# Patient Record
Sex: FEMALE | Race: WHITE | URBAN - METROPOLITAN AREA
[De-identification: names, ages, dates, MRNs, and addresses within clinical notes are randomized per-mention and may not be internally consistent; named-entity substitution may affect disease eponyms.]

---

## 2017-04-04 ENCOUNTER — OFFICE VISIT (OUTPATIENT)
Dept: FAMILY MEDICINE | Facility: CLINIC | Age: 32
End: 2017-04-04

## 2017-04-04 VITALS
TEMPERATURE: 98.5 F | HEIGHT: 66 IN | RESPIRATION RATE: 20 BRPM | HEART RATE: 64 BPM | WEIGHT: 152 LBS | DIASTOLIC BLOOD PRESSURE: 70 MMHG | BODY MASS INDEX: 24.43 KG/M2 | SYSTOLIC BLOOD PRESSURE: 118 MMHG

## 2017-04-04 DIAGNOSIS — Q79.60 EHLERS-DANLOS SYNDROME: ICD-10-CM

## 2017-04-04 DIAGNOSIS — Z01.818 PRE-OP EXAM: Primary | ICD-10-CM

## 2017-04-04 LAB
ERYTHROCYTE [DISTWIDTH] IN BLOOD BY AUTOMATED COUNT: 10.5 %
HCT VFR BLD AUTO: 42 % (ref 35–47)
HEMOGLOBIN: 13.6 G/DL (ref 11.7–15.7)
MCH RBC QN AUTO: 30.4 PG (ref 26–33)
MCHC RBC AUTO-ENTMCNC: 32.4 G/DL (ref 31–36)
MCV RBC AUTO: 93.7 FL (ref 78–100)
PLATELET COUNT - QUEST: 224 10^9/L (ref 150–375)
RBC # BLD AUTO: 4.48 10*12/L (ref 3.8–5.2)
WBC # BLD AUTO: 4.2 10*9/L (ref 4–11)

## 2017-04-04 PROCEDURE — 36415 COLL VENOUS BLD VENIPUNCTURE: CPT | Performed by: PHYSICIAN ASSISTANT

## 2017-04-04 PROCEDURE — 99214 OFFICE O/P EST MOD 30 MIN: CPT | Performed by: PHYSICIAN ASSISTANT

## 2017-04-04 PROCEDURE — 85027 COMPLETE CBC AUTOMATED: CPT | Performed by: PHYSICIAN ASSISTANT

## 2017-04-04 NOTE — NURSING NOTE
Fiorella Simpson is here for a pre-op exam.  Questioned patient about current smoking habits.  Pt. has never smoked.  Body mass index is 25.89 kg/(m^2).  PULSE regular  My Chart: active  CLASSIFICATION OF OVERWEIGHT AND OBESITY BY BMI                        Obesity Class           BMI(kg/m2)  Underweight                                    < 18.5  Normal                                         18.5-24.9  Overweight                                     25.0-29.9  OBESITY                     I                  30.0-34.9                             II                 35.0-39.9  EXTREME OBESITY             III                >40                            Patient's  BMI Body mass index is 24.91 kg/(m^2).  http://hin.nhlbi.nih.gov/menuplanner/menu.cgi    Pre-visit planning  Immunizations - up to date  Colonoscopy -   Mammogram -   Asthma -   PHQ9 -    ASHER-7 -

## 2017-04-04 NOTE — PROGRESS NOTES
[facility]  [address]  [address]  [facility] [address]  [phone]  Dept: [phone]    PRE-OP EVALUATION:  Today's date: 2017    Fiorella Simpson (: 1985) presents for pre-operative evaluation assessment as requested by Dr. Cornell.  She requires evaluation and anesthesia risk assessment prior to undergoing surgery/procedure for treatment of left foot .  Proposed procedure: left navicular tendon repair    Date of Surgery/ Procedure: 17  Time of Surgery/ Procedure: 9am  Hospital/Surgical Facility: [facility]  Fax number for surgical facility: [phone], [phone]  Primary Physician: Ahsan Saldivar  Type of Anesthesia Anticipated: to be determined    Patient has a Health Care Directive or Living Will:  NO    1. NO - Do you have a history of heart attack, stroke, stent, bypass or surgery on an artery in the head, neck, heart or legs?  2. NO - Do you ever have any pain or discomfort in your chest?  3. NO - Do you have a history of  Heart Failure?  4. NO - Are you troubled by shortness of breath when: walking on the level, up a slight hill or at night?  5. NO - Do you currently have a cold, bronchitis or other respiratory infection?  6. NO - Do you have a cough, shortness of breath or wheezing?  7. NO - Do you sometimes get pains in the calves of your legs when you walk?  8. NO - Do you or anyone in your family have previous history of blood clots?  9. NO - Do you or does anyone in your family have a serious bleeding problem such as prolonged bleeding following surgeries or cuts?  10. NO - Have you ever had problems with anemia or been told to take iron pills?  11. NO - Have you had any abnormal blood loss such as black, tarry or bloody stools, or abnormal vaginal bleeding?  12. NO - Have you ever had a blood transfusion?  13. NO - Have you or any of your relatives ever had problems with anesthesia?  14. NO - Do you have sleep apnea,  excessive snoring or daytime drowsiness?  15. NO - Do you have any prosthetic heart valves?  16. NO - Do you have prosthetic joints?  17. NO - Is there any chance that you may be pregnant? Facility will do pregnancy test as well on day of operation.      HPI:                                                      Brief HPI related to upcoming procedure: Patty has been having left ankle pain since January 2016. Has tried PT, rest, and various treatments. Dr. Cornell did MRI and believes her navicular tendon is torn, and there is a small fracture. Plan is to reattach tendon, and remove piece of bone. Has not taking anything for the pain.    See problem list for active medical problems.  Problems all longstanding and stable, except as noted/documented.  See ROS for pertinent symptoms related to these conditions.       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 Saw , being managed at [facility]. Contact Slime Saba DNP       Ankle pain 06/26/2013     Priority: Medium     Allergic rhinitis 08/10/2010     Priority: Medium     Problem list name updated by automated process. Provider to review       Obsessive-compulsive disorder 04/25/2007     Priority: Medium     Problem list name updated by automated process. Provider to review       Dysmenorrhea 12/06/2005     Priority: Medium     Other acne 12/06/2005     Priority: Medium     Health Care Home 03/18/2013     Priority: Low     State Tier Level:  Tier 1  Status:  n/a  Care Coordinator:      See Letters for [facility] Care Plan           Living will, counseling/discussion 07/12/2012     Priority: Low     Discussed advance care planning with patient; however, patient declined at this time. 7/12/2012           No past medical history on file.  Past  "Surgical History:   Procedure Laterality Date     C APPENDECTOMY  1999     DENTAL SURGERY  4/2006    [facility]     FOOT SURGERY Right 2009, 2012    fracture repair     GALLBLADDER SURGERY  3/16/11    Dr Okeefe     HERNIORRHAPHY INGUINAL  5/23/2012    Procedure:HERNIORRHAPHY INGUINAL; Left Inguinal Hernia Repair with Mesh       Current Outpatient Prescriptions   Medication Sig Dispense Refill     drospirenone-ethinyl estradiol (JOSEPH) 3-0.03 MG per tablet Take 1 tablet by mouth daily 3 Package 3     OTC products: None, except as noted above    Allergies   Allergen Reactions     Morphine Hives      Latex Allergy: NO    Social History   Substance Use Topics     Smoking status: Never Smoker     Smokeless tobacco: Never Used     Alcohol use 0.0 oz/week     0 drink(s) per week      Comment: rare     History   Drug Use No       REVIEW OF SYSTEMS:                                                    Constitutional, neuro, ENT, endocrine, pulmonary, cardiac, gastrointestinal, genitourinary, musculoskeletal, integument and psychiatric systems are negative, except as otherwise noted.    EXAM:                                                    /70 (BP Location: Right arm, Patient Position: Chair, Cuff Size: Adult Regular)  Pulse 64  Temp 98.5  F (36.9  C) (Oral)  Resp 20  Ht 1.664 m (5' 5.5\")  Wt 68.9 kg (152 lb)  LMP 03/29/2017  BMI 24.91 kg/m2    GENERAL APPEARANCE: healthy, alert and no distress     EYES: EOMI, PERRL     HENT: ear canals and TM's normal and nose and mouth without ulcers or lesions     NECK: no adenopathy, no asymmetry, masses, or scars and thyroid normal to palpation     RESP: lungs clear to auscultation - no rales, rhonchi or wheezes     CV: regular rates and rhythm, normal S1 S2, no S3 or S4 and no murmur, click or rub     ABDOMEN:  soft, nontender, no HSM or masses and bowel sounds normal     MS: extremities normal- no gross deformities noted, no evidence of inflammation in joints, FROM in all " extremities.     SKIN: no suspicious lesions or rashes     NEURO: Normal strength and tone, sensory exam grossly normal, mentation intact and speech normal     PSYCH: mentation appears normal. and affect normal/bright     LYMPHATICS: No axillary, cervical, or supraclavicular nodes    DIAGNOSTICS:                                                    Hemoglobin (indicated for history of anemia or procedure with significant blood loss such as tonsillectomy, major intraperitoneal surgery, vascular surgery, major spine surgery, total joint replacement)  EKG not indicated with age <65, no risk factors.     Office Visit on 04/04/2017   Component Date Value Ref Range Status     WBC 04/04/2017 4.2  4.0 - 11 10*9/L Final     RBC Count 04/04/2017 4.48  3.8 - 5.2 10*12/L Final     Hemoglobin 04/04/2017 13.6  11.7 - 15.7 g/dL Final     Hematocrit 04/04/2017 42.0  35.0 - 47.0 % Final     MCV 04/04/2017 93.7  78 - 100 fL Final     MCH 04/04/2017 30.4  26 - 33 pg Final     MCHC 04/04/2017 32.4  31 - 36 g/dL Final     RDW 04/04/2017 10.5  % Final     Platelet Count 04/04/2017 224  150 - 375 10^9/L Final           IMPRESSION:                                                    Reason for surgery/procedure: Navicular tendon tear in left ankle.  Diagnosis/reason for consult: Pre-operative examination.    The proposed surgical procedure is considered INTERMEDIATE risk.    REVISED CARDIAC RISK INDEX  The patient has the following serious cardiovascular risks for perioperative complications such as (MI, PE, VFib and 3  AV Block):  No serious cardiac risks  INTERPRETATION: 0 risks: Class I (very low risk - 0.4% complication rate)    The patient has the following additional risks for perioperative complications:  No identified additional risks      ICD-10-CM    1. Pre-op exam Z01.818 HEMOGRAM/PLATELET (BFP)     VENOUS COLLECTION   2. Chance-Danlos syndrome Q79.6        RECOMMENDATIONS:                                                         --Patient is to take all scheduled medications on the day of surgery EXCEPT for modifications listed below.    APPROVAL GIVEN to proceed with proposed procedure, without further diagnostic evaluation    1. Seven days before surgery do not take Aspirin or any over-the-counter pain medications other than Tylenol.  TYLENOL is the safest pain pill to use before surgery because it does not affect your bleeding time. If tylenol is not sufficient for pain control talk to me or the surgeon and we will decide what is safe to use.    2. Do not eat anything after midnight  (betty of the surgery) and nothing the morning of the surgery.    3. Medications: Take OCP as usual on day before surgery, then wait until after surgery to take that day's pill.     4. Follow all instructions given by the surgery team. They usually give out a packet. Read it and please follow it precisely. This helps surgical experience and outcomes.    5. If you have any questions do not hesitate to call me or the surgeon/surgical team.      Marli Chamberlain PA-C  [facility]         Signed Electronically by: Marli Chamberlain PA-C    Copy of this evaluation report is provided to requesting physician.    [facility] Preop Guidelines

## 2017-04-04 NOTE — MR AVS SNAPSHOT
"              After Visit Summary   4/4/2017    Fiorella Simpson    MRN: [id number]           Patient Information     Date Of Birth          1985        Visit Information        Provider Department      4/4/2017 11:00 AM Marli Chamberlain PA-C [facility]        Today's Diagnoses     Pre-op exam    -  1    Chance-Danlos syndrome           Follow-ups after your visit        Follow-up notes from your care team     Return if symptoms worsen or fail to improve.      Who to contact     If you have questions or need follow up information about today's clinic visit or your schedule please contact [facility] directly at [phone].  Normal or non-critical lab and imaging results will be communicated to you by [employer], letter or phone within 4 business days after the clinic has received the results. If you do not hear from us within 7 days, please contact the clinic through [employer] or phone. If you have a critical or abnormal lab result, we will notify you by phone as soon as possible.  Submit refill requests through [employer] or call your pharmacy and they will forward the refill request to us. Please allow 3 business days for your refill to be completed.          Additional Information About Your Visit        MyChart Information     [employer] gives you secure access to your electronic health record. If you see a primary care provider, you can also send messages to your care team and make appointments. If you have questions, please call your primary care clinic.  If you do not have a primary care provider, please call [phone] and they will assist you.        Care EveryWhere ID     This is your Care EveryWhere ID. This could be used by other organizations to access your [place] medical records  [id number]        Your Vitals Were     Pulse Temperature Respirations Height Last Period BMI (Body Mass Index)    64 98.5  F (36.9  C) (Oral) 20 1.664 m (5' 5.5\") " 03/29/2017 24.91 kg/m2       Blood Pressure from Last 3 Encounters:   04/04/17 118/70   03/12/15 130/85   04/14/14 114/68    Weight from Last 3 Encounters:   04/04/17 68.9 kg (152 lb)   03/12/15 71.4 kg (157 lb 6 oz)   04/14/14 74.4 kg (164 lb)              We Performed the Following     HEMOGRAM/PLATELET (BFP)     VENOUS COLLECTION        Primary Care Provider Office Phone # Fax #    Ahsan Saldivar [phone]       [facility] [address] [facility] [address]  [facility] [address]        Thank you!     Thank you for choosing [facility]  for your care. Our goal is always to provide you with excellent care. Hearing back from our patients is one way we can continue to improve our services. Please take a few minutes to complete the written survey that you may receive in the mail after your visit with us. Thank you!             Your Updated Medication List - Protect others around you: Learn how to safely use, store and throw away your medicines at www.disposemymeds.org.          This list is accurate as of: 4/4/17 11:59 PM.  Always use your most recent med list.                   Brand Name Dispense Instructions for use    drospirenone-ethinyl estradiol 3-0.03 MG per tablet    JOSEPH    3 Package    Take 1 tablet by mouth daily

## 2017-04-05 ENCOUNTER — TELEPHONE (OUTPATIENT)
Dept: FAMILY MEDICINE | Facility: CLINIC | Age: 32
End: 2017-04-05

## 2017-04-05 NOTE — TELEPHONE ENCOUNTER
[facility] is requesting pre-op notes for surgery tomorrow.  It is currently unsigned please have Marli sign and fax to [phone].

## 2017-06-03 ENCOUNTER — HEALTH MAINTENANCE LETTER (OUTPATIENT)
Age: 32
End: 2017-06-03

## 2017-06-09 ENCOUNTER — THERAPY VISIT (OUTPATIENT)
Dept: PHYSICAL THERAPY | Facility: CLINIC | Age: 32
End: 2017-06-09
Payer: COMMERCIAL

## 2017-06-09 DIAGNOSIS — Z47.89 AFTERCARE FOLLOWING SURGERY OF THE MUSCULOSKELETAL SYSTEM: ICD-10-CM

## 2017-06-09 DIAGNOSIS — S99.922A FOOT INJURY, LEFT, INITIAL ENCOUNTER: Primary | ICD-10-CM

## 2017-06-09 PROCEDURE — 97110 THERAPEUTIC EXERCISES: CPT | Mod: GP | Performed by: PHYSICAL THERAPIST

## 2017-06-09 PROCEDURE — 97161 PT EVAL LOW COMPLEX 20 MIN: CPT | Mod: GP | Performed by: PHYSICAL THERAPIST

## 2017-06-09 NOTE — LETTER
KATHIE B[facility]MUDE[facility] [place] [facility]  [address] [address]  [address]  [facility] [address]  [phone]    2017  Re: Fiorella Simpson   :   1985  MRN:  [id number]   REFERRING PHYSICIAN:   Leonardo BERMUDEZ [place] [facility]  Date of Initial Evaluation:  2017  Visits:  Rxs Used: 1  Reason for Referral:     Foot injury, left, initial encounter  Aftercare following surgery of the musculoskeletal system    EVALUATION SUMMARY  Fiorella Simpson is a 31 year old female patient presenting to Physical Therapy with the following Primary Symptoms: Left sided ankle pain and stiffness, some tenderness along the inside of the left foot.  She denies having related symptoms spreading to the toes.  She does have some heel bruising. These pains are described as constant.   She denies having painful cough/sneeze, saddle anaesthesia, severe night pain, peripheral motor deficit, recent bowel/bladder change, recent vision change, ringing in the ears or pain with swallowing. Pain is rated 0/10 at rest, and 2/10 at worst. History of symptoms: Long Hx of foot pains,  Two surg on right side 2008, and ankle stab procedure .  L foot pain started 2016 stepped and felt immediate pain.  Now underwent Kidner procedure.  2017.  Since onset, these pains are improving :  Current Symptoms are worsened by: stair climbing sideways, walking out of brace, avoiding incline decline ramps, . Current Symptoms are relieved by ankle brace.   Recent surgical procedure: L Kidner procedure Performed on 2016.   General health as reported by patient is:  excellent.  Pertinent medical history: hernia repair abdominal with mesh, gallbladder, appendix, nasal septoplasty.  She denies any significant current illness or recent hospital admissions. She denies any regonal implanted devices.  Current occupational status: work from home, computer. Previous functional status:  fully functional prior to pain onset/injury.        Objective:  Gait:  Bilateral femoral IR, bilat toe in, short stride    Ankle/Foot Evaluation  ROM:  AROM is normal.PROM is normal.  Strength:    Dorsiflexion:  Left: 5/5     Pain:     Plantarflexion: Left: 4-/5   Pain:     Inversion:Left: 4/5  Pain:       Eversion:Left: 4/5  Pain:    LIGAMENT TESTING: not assessed  SPECIAL TESTS: not assessed  PALPATION:   Left ankle tenderness present at:  incisional  EDEMA: normal    Re: Fiorella Simpson   :   1985    MOBILITY TESTING:   Talocrural Left: normal      Subtalar Left: normal      Midtarsal Left: hypermobile      First Ray Left: hypermobile      FUNCTIONAL TESTS:   Quad:  Single Leg Squat Left: Control is mild loss of control.  Proprioception:  Stork Balance Test: Left: 3 seconds eyes closed  Right: 10 sec eyes closed     Assessment/Plan:    Patient is a 31 year old female with left side ankle complaints.    Patient has the following significant findings with corresponding treatment plan.                Diagnosis 1:  S/P navicular debriedment Kidner procedure   Pain -  hot/cold therapy, manual therapy, splint/taping/bracing/orthotics, self management, education, directional preference exercise and home program  Decreased ROM/flexibility - manual therapy and therapeutic exercise  Decreased strength - therapeutic exercise and therapeutic activities  Decreased proprioception - neuro re-education and therapeutic activities  Inflammation - cold therapy and self management/home program  Impaired gait - gait training  Impaired muscle performance - neuro re-education  Decreased function - therapeutic activities  Impaired posture - neuro re-education  Instability -  Therapeutic Activity  Therapeutic Exercise    Therapy Evaluation Codes:   1) History comprised of:   Personal factors that impact the plan of care:      None.    Comorbidity factors that impact the plan of care are:      None.     Medications impacting care: None.  2) Examination of Body Systems  comprised of:   Body structures and functions that impact the plan of care:      foot.   Activity limitations that impact the plan of care are:      Jumping, Running, Squatting/kneeling, Stairs, Standing and Walking.  3) Clinical presentation characteristics are:   Stable/Uncomplicated.  4) Decision-Making    Low complexity using standardized patient assessment instrument and/or measureable assessment of functional outcome.                Re: Fiorella Simpson   :   1985    Cumulative Therapy Evaluation is: Low complexity.  Previous and current functional limitations:  (See Goal Flow Sheet for this information)    Short term and Long term goals: (See Goal Flow Sheet for this information)   Communication ability:  Patient appears to be able to clearly communicate and understand verbal and written communication and follow directions correctly.  Treatment Explanation - The following has been discussed with the patient:   RX ordered/plan of care  Anticipated outcomes  Possible risks and side effects  This patient would benefit from PT intervention to resume normal activities.   Rehab potential is excellent.  Frequency:  1 X week, once daily  Duration:  for 8 weeks  Discharge Plan:  Achieve all LTG.  Independent in home treatment program.  Reach maximal therapeutic benefit.    Thank you for your referral.    INQUIRIES  Therapist: Paul Breyen, MA, PT, OCS, Cert. PRISCILLA BERMU[facility] [place] [facility]  [address] [address] [address]  Phone: [phone]  Fax: [phone]

## 2017-06-09 NOTE — PROGRESS NOTES
Fiorella Simpson is a 31 year old female patient presenting to Physical Therapy with the following Primary Symptoms: Left sided ankle pain and stiffness, some tenderness along the inside of the left foot.  She denies having related symptoms spreading to the toes.  She does have some heel bruising. These pains are described as constant.   She denies having painful cough/sneeze, saddle anaesthesia, severe night pain, peripheral motor deficit, recent bowel/bladder change, recent vision change, ringing in the ears or pain with swallowing. Pain is rated 0/10 at rest, and 2/10 at worst. History of symptoms: Long Hx of foot pains,  Two surg on right side Jan 2008, and ankle stab procedure 2013.  L foot pain started Jan 2016 stepped and felt immediate pain.  Now underwent Kidner procedure.  April 7th 2017.  Since onset, these pains are improving :  Current Symptoms are worsened by: stair climbing sideways, walking out of brace, avoiding incline decline ramps, . Current Symptoms are relieved by ankle brace.   Recent surgical procedure: L Kidner procedure Performed on April 7th 2016.   General health as reported by patient is:  excellent.  Pertinent medical history: hernia repair abdominal with mesh, gallbladder, appendix, nasal septoplasty.  She denies any significant current illness or recent hospital admissions. She denies any regonal implanted devices.  Current occupational status: work from home, computer. Previous functional status:  fully functional prior to pain onset/injury.         HPI                    Objective:      Gait:  Bilateral femoral IR, bilat toe in, short stride                Ankle/Foot Evaluation  ROM:  AROM is normal.PROM is normal.      Strength:    Dorsiflexion:  Left: 5/5     Pain:     Plantarflexion: Left: 4-/5   Pain:     Inversion:Left: 4/5  Pain:       Eversion:Left: 4/5  Pain:                    LIGAMENT TESTING: not assessed              SPECIAL TESTS: not assessed    PALPATION:   Left ankle  tenderness present at:  incisional    EDEMA: normal          MOBILITY TESTING:       Talocrural Left: normal      Subtalar Left: normal      Midtarsal Left: hypermobile      First Ray Left: hypermobile      FUNCTIONAL TESTS:         Quad:  Single Leg Squat Left: Control is mild loss of control.        Proprioception:  Stork Balance Test: Left: 3 seconds eyes closed  Right: 10 sec eyes closed                                                     General     ROS    Assessment/Plan:      Patient is a 31 year old female with left side ankle complaints.    Patient has the following significant findings with corresponding treatment plan.                Diagnosis 1:  S/P navicular debriedment Kidner procedure   Pain -  hot/cold therapy, manual therapy, splint/taping/bracing/orthotics, self management, education, directional preference exercise and home program  Decreased ROM/flexibility - manual therapy and therapeutic exercise  Decreased strength - therapeutic exercise and therapeutic activities  Decreased proprioception - neuro re-education and therapeutic activities  Inflammation - cold therapy and self management/home program  Impaired gait - gait training  Impaired muscle performance - neuro re-education  Decreased function - therapeutic activities  Impaired posture - neuro re-education  Instability -  Therapeutic Activity  Therapeutic Exercise    Therapy Evaluation Codes:   1) History comprised of:   Personal factors that impact the plan of care:      None.    Comorbidity factors that impact the plan of care are:      None.     Medications impacting care: None.  2) Examination of Body Systems comprised of:   Body structures and functions that impact the plan of care:      foot.   Activity limitations that impact the plan of care are:      Jumping, Running, Squatting/kneeling, Stairs, Standing and Walking.  3) Clinical presentation characteristics are:   Stable/Uncomplicated.  4) Decision-Making    Low complexity using  standardized patient assessment instrument and/or measureable assessment of functional outcome.  Cumulative Therapy Evaluation is: Low complexity.    Previous and current functional limitations:  (See Goal Flow Sheet for this information)    Short term and Long term goals: (See Goal Flow Sheet for this information)     Communication ability:  Patient appears to be able to clearly communicate and understand verbal and written communication and follow directions correctly.  Treatment Explanation - The following has been discussed with the patient:   RX ordered/plan of care  Anticipated outcomes  Possible risks and side effects  This patient would benefit from PT intervention to resume normal activities.   Rehab potential is excellent.    Frequency:  1 X week, once daily  Duration:  for 8 weeks  Discharge Plan:  Achieve all LTG.  Independent in home treatment program.  Reach maximal therapeutic benefit.    Please refer to the daily flowsheet for treatment today, total treatment time and time spent performing 1:1 timed codes.

## 2017-06-16 ENCOUNTER — THERAPY VISIT (OUTPATIENT)
Dept: PHYSICAL THERAPY | Facility: CLINIC | Age: 32
End: 2017-06-16
Payer: COMMERCIAL

## 2017-06-16 DIAGNOSIS — Z47.89 AFTERCARE FOLLOWING SURGERY OF THE MUSCULOSKELETAL SYSTEM: ICD-10-CM

## 2017-06-16 DIAGNOSIS — S99.922A FOOT INJURY, LEFT, INITIAL ENCOUNTER: ICD-10-CM

## 2017-06-16 PROCEDURE — 97110 THERAPEUTIC EXERCISES: CPT | Mod: GP | Performed by: PHYSICAL THERAPIST

## 2017-06-16 PROCEDURE — 97140 MANUAL THERAPY 1/> REGIONS: CPT | Mod: GP | Performed by: PHYSICAL THERAPIST

## 2017-06-23 ENCOUNTER — THERAPY VISIT (OUTPATIENT)
Dept: PHYSICAL THERAPY | Facility: CLINIC | Age: 32
End: 2017-06-23
Payer: COMMERCIAL

## 2017-06-23 DIAGNOSIS — S99.922A FOOT INJURY, LEFT, INITIAL ENCOUNTER: ICD-10-CM

## 2017-06-23 DIAGNOSIS — Z47.89 AFTERCARE FOLLOWING SURGERY OF THE MUSCULOSKELETAL SYSTEM: ICD-10-CM

## 2017-06-23 PROCEDURE — 97112 NEUROMUSCULAR REEDUCATION: CPT | Mod: GP | Performed by: PHYSICAL THERAPIST

## 2017-06-23 PROCEDURE — 97110 THERAPEUTIC EXERCISES: CPT | Mod: GP | Performed by: PHYSICAL THERAPIST

## 2017-06-23 PROCEDURE — 97530 THERAPEUTIC ACTIVITIES: CPT | Mod: GP | Performed by: PHYSICAL THERAPIST

## 2017-06-30 ENCOUNTER — THERAPY VISIT (OUTPATIENT)
Dept: PHYSICAL THERAPY | Facility: CLINIC | Age: 32
End: 2017-06-30
Payer: COMMERCIAL

## 2017-06-30 DIAGNOSIS — S99.922A FOOT INJURY, LEFT, INITIAL ENCOUNTER: ICD-10-CM

## 2017-06-30 DIAGNOSIS — Z47.89 AFTERCARE FOLLOWING SURGERY OF THE MUSCULOSKELETAL SYSTEM: ICD-10-CM

## 2017-06-30 PROCEDURE — 97112 NEUROMUSCULAR REEDUCATION: CPT | Mod: GP | Performed by: PHYSICAL THERAPIST

## 2017-06-30 PROCEDURE — 97530 THERAPEUTIC ACTIVITIES: CPT | Mod: GP | Performed by: PHYSICAL THERAPIST

## 2017-07-07 ENCOUNTER — THERAPY VISIT (OUTPATIENT)
Dept: PHYSICAL THERAPY | Facility: CLINIC | Age: 32
End: 2017-07-07
Payer: COMMERCIAL

## 2017-07-07 DIAGNOSIS — S99.922A FOOT INJURY, LEFT, INITIAL ENCOUNTER: ICD-10-CM

## 2017-07-07 DIAGNOSIS — Z47.89 AFTERCARE FOLLOWING SURGERY OF THE MUSCULOSKELETAL SYSTEM: ICD-10-CM

## 2017-07-07 PROCEDURE — 97530 THERAPEUTIC ACTIVITIES: CPT | Mod: GP | Performed by: PHYSICAL THERAPIST

## 2017-07-07 PROCEDURE — 97112 NEUROMUSCULAR REEDUCATION: CPT | Mod: GP | Performed by: PHYSICAL THERAPIST

## 2017-07-21 ENCOUNTER — THERAPY VISIT (OUTPATIENT)
Dept: PHYSICAL THERAPY | Facility: CLINIC | Age: 32
End: 2017-07-21
Payer: COMMERCIAL

## 2017-07-21 DIAGNOSIS — S99.922A FOOT INJURY, LEFT, INITIAL ENCOUNTER: ICD-10-CM

## 2017-07-21 DIAGNOSIS — Z47.89 AFTERCARE FOLLOWING SURGERY OF THE MUSCULOSKELETAL SYSTEM: ICD-10-CM

## 2017-07-21 PROCEDURE — 97140 MANUAL THERAPY 1/> REGIONS: CPT | Mod: GP | Performed by: PHYSICAL THERAPIST

## 2017-07-21 PROCEDURE — 97530 THERAPEUTIC ACTIVITIES: CPT | Mod: GP | Performed by: PHYSICAL THERAPIST

## 2017-07-21 PROCEDURE — 97112 NEUROMUSCULAR REEDUCATION: CPT | Mod: GP | Performed by: PHYSICAL THERAPIST

## 2017-08-11 ENCOUNTER — THERAPY VISIT (OUTPATIENT)
Dept: PHYSICAL THERAPY | Facility: CLINIC | Age: 32
End: 2017-08-11
Payer: COMMERCIAL

## 2017-08-11 DIAGNOSIS — S99.922A FOOT INJURY, LEFT, INITIAL ENCOUNTER: ICD-10-CM

## 2017-08-11 DIAGNOSIS — Z47.89 AFTERCARE FOLLOWING SURGERY OF THE MUSCULOSKELETAL SYSTEM: ICD-10-CM

## 2017-08-11 PROCEDURE — 97112 NEUROMUSCULAR REEDUCATION: CPT | Mod: GP | Performed by: PHYSICAL THERAPIST

## 2017-08-11 PROCEDURE — 97530 THERAPEUTIC ACTIVITIES: CPT | Mod: GP | Performed by: PHYSICAL THERAPIST

## 2017-08-11 NOTE — PROGRESS NOTES
Subjective:    HPI                    Objective:    System    Physical Exam    General     ROS    Assessment/Plan:      DISCHARGE REPORT    Progress reporting period is from 6/9/2017 to 8/11/2017.       SUBJECTIVE  Subjective changes noted by patient: Patient did a 5k in under 50 minutes while walking with the brace. Did 45 minutes on a paved trail without the brace. 15 minutes on an unpaved trail without the brace. In general has not been wearing the brace much and has been having no issues. Had one instance of pain where a little girl pulled on her arm and she had to take a quick step to maintain balance; she was wearing the brace when this happened.     Current Pain level: 0/10.     Changes in function:  Yes (See Goal flowsheet attached for changes in current functional level)  Adverse reaction to treatment or activity: None    OBJECTIVE  Changes noted in objective findings:  Yes, Patient demonstrates improved control and stability with her ankle against external forces and perturbations. She demonstrates improved strength with single leg heel raise exercises. She demonstrates improved function with walking and hopping.         ASSESSMENT/PLAN  Updated problem list and treatment plan: Diagnosis 1:  S/P navicular debriedment Milad procedure  STG/LTGs have been met or progress has been made towards goals:  Yes (See Goal flow sheet completed today.)  Assessment of Progress: The patient's condition is improving.  The patient has met all of their long term goals.  Self Management Plans:  Patient is independent in a home treatment program.  Patient is independent in self management of symptoms.  Fiorella continues to require the following intervention to meet STG and LTG's:  PT intervention is no longer required to meet STG/LTG.    Recommendations:  This patient is ready to be discharged from therapy and continue their home treatment program.    Please refer to the daily flowsheet for treatment today, total treatment  time and time spent performing 1:1 timed codes.

## 2017-08-11 NOTE — LETTER
KATHIE BERMUDEZ [facility] PT  [address]  [address]  [place] [address]  [phone]    2017    Re: Fiorella Simpson   :   1985  MRN:  [id number]   REFERRING PHYSICIAN:   Leonardo HERMAN PT  Date of Initial Evaluation:  17  Visits:  Rxs Used: 7  Reason for Referral:     Foot injury, left, initial encounter  Aftercare following surgery of the musculoskeletal system    DISCHARGE REPORT  Progress reporting period is from 2017 to 2017.     SUBJECTIVE  Subjective changes noted by patient: Patient did a 5k in under 50 minutes while walking with the brace.  Did 45 minutes on a paved trail without the brace.  15 minutes on an unpaved trail without the brace.  In general has not been wearing the brace much and has been having no issues.  Had one instance of pain where a little girl pulled on her arm and she had to take a quick step to maintain balance; she was wearing the brace when this happened.     Current Pain level: 0/10.     Changes in function: Yes (See Goal flowsheet attached for changes in current functional level)  Adverse reaction to treatment or activity: None    OBJECTIVE  Changes noted in objective findings: Yes, Patient demonstrates improved control and stability with her ankle against external forces and perturbations.  She demonstrates improved strength with single leg heel raise exercises.  She demonstrates improved function with walking and hopping.       ASSESSMENT/PLAN  Updated problem list and treatment plan: Diagnosis 1:  S/P navicular debriedment Kidner procedure  STG/LTGs have been met or progress has been made towards goals: Yes (See Goal flow sheet completed today.)  Assessment of Progress: The patient's condition is improving.  The patient has met all of their long term goals.  Self Management Plans:  Patient is independent in a home treatment program.  Patient is independent in self management of symptoms.  Fiorella continues to  require the following intervention to meet STG and LTGs: PT intervention is no longer required to meet STG/LTG.    Recommendations:  This patient is ready to be discharged from therapy and continue her home treatment program.      Thank you for your referral.    INQUIRIES  Therapist: RONALDO Navas [facility]  [address]  [address]  [place] [place] [address]  Phone: [phone]/Fax: [phone]

## 2017-08-12 ENCOUNTER — OFFICE VISIT (OUTPATIENT)
Dept: FAMILY MEDICINE | Facility: CLINIC | Age: 32
End: 2017-08-12

## 2017-08-12 VITALS
TEMPERATURE: 98.1 F | SYSTOLIC BLOOD PRESSURE: 110 MMHG | HEART RATE: 85 BPM | HEIGHT: 66 IN | DIASTOLIC BLOOD PRESSURE: 70 MMHG | OXYGEN SATURATION: 98 % | BODY MASS INDEX: 24.68 KG/M2 | WEIGHT: 153.6 LBS

## 2017-08-12 DIAGNOSIS — Z71.89 ACP (ADVANCE CARE PLANNING): ICD-10-CM

## 2017-08-12 DIAGNOSIS — Z01.419 ENCOUNTER FOR GYNECOLOGICAL EXAMINATION WITHOUT ABNORMAL FINDING: Primary | ICD-10-CM

## 2017-08-12 DIAGNOSIS — N94.6 DYSMENORRHEA: ICD-10-CM

## 2017-08-12 DIAGNOSIS — L70.8 OTHER ACNE: ICD-10-CM

## 2017-08-12 DIAGNOSIS — Q79.60 EHLERS-DANLOS DISEASE: ICD-10-CM

## 2017-08-12 DIAGNOSIS — Z12.4 SCREENING FOR CERVICAL CANCER: ICD-10-CM

## 2017-08-12 PROCEDURE — 88142 CYTOPATH C/V THIN LAYER: CPT | Mod: 90 | Performed by: PHYSICIAN ASSISTANT

## 2017-08-12 PROCEDURE — 99395 PREV VISIT EST AGE 18-39: CPT | Performed by: PHYSICIAN ASSISTANT

## 2017-08-12 RX ORDER — DROSPIRENONE AND ETHINYL ESTRADIOL 0.03MG-3MG
1 KIT ORAL DAILY
Qty: 84 TABLET | Refills: 3 | Status: SHIPPED | OUTPATIENT
Start: 2017-08-12 | End: 2018-03-27

## 2017-08-12 NOTE — NURSING NOTE
Fiorella is here for CPX no blood work here today    Patient is here for a full physical exam.    Pre-Visit Screening :  Immunizations : up to date  Colon Screening : NA  Mammogram:NA  Asthma Action Test/Plan : NA  PHQ9/GAD7 :  NA  Fall Risk Assessment NA    Vitals:  Pulse - regular  My Chart - accepts    CLASSIFICATION OF OVERWEIGHT AND OBESITY BY BMI                         Obesity Class           BMI(kg/m2)  Underweight                                    < 18.5  Normal                                         18.5-24.9  Overweight                                     25.0-29.9  OBESITY                     I                  30.0-34.9                              II                 35.0-39.9  EXTREME OBESITY             III                >40                             Patient's  BMI Body mass index is 25.17 kg/(m^2).  http://hin.nhlbi.nih.gov/menuplanner/menu.cgi  Questioned patient about current smoking habits.  Pt. has never smoked.    ETOH screening:  Questions:  1-How often do you have a drink containing alcohol?                             1 times per week(s)  2-How many drinks containing alcohol do you have on a typical day when you are         Drinking?                              2   3- How often do you have 5 or more drinks on one occasion?                              Never    Have you ever:  None of the patient's responses to the CAGE screening were positive / Negative CAGE score

## 2017-08-12 NOTE — MR AVS SNAPSHOT
After Visit Summary   8/12/2017    Fiorella Simpson    MRN: [id number]           Patient Information     Date Of Birth          1985        Visit Information        Provider Department      8/12/2017 9:00 AM Marli Chamberlain PA-C [facility]        Today's Diagnoses     Encounter for gynecological examination without abnormal finding    -  1    Dysmenorrhea        Other acne        Screening for cervical cancer        Chance-Danlos disease        ACP (advance care planning)           Follow-ups after your visit        Additional Services     PHYSICAL THERAPY REFERRAL       Patient going to [facility] to work on strengthening for Chance Danlos     [facility]  [address] , [address]  [place] [address]  Clinic phone: [phone]  Clinic fax: [phone]  Appointments: [phone]                  Follow-up notes from your care team     Return in about 1 year (around 8/12/2018) for Physical Exam.      Who to contact     If you have questions or need follow up information about today's clinic visit or your schedule please contact [facility] directly at [phone].  Normal or non-critical lab and imaging results will be communicated to you by MyChart, letter or phone within 4 business days after the clinic has received the results. If you do not hear from us within 7 days, please contact the clinic through [employer] or phone. If you have a critical or abnormal lab result, we will notify you by phone as soon as possible.  Submit refill requests through [employer] for iPhone and Android or call your pharmacy and they will forward the refill request to us. Please allow 3 business days for your refill to be completed.          Additional Information About Your Visit        MyChart Information     Blink for iPhone and Android gives you secure access to your electronic health record. If you see a  "primary care provider, you can also send messages to your care team and make appointments. If you have questions, please call your primary care clinic.  If you do not have a primary care provider, please call [phone] and they will assist you.        Care EveryWhere ID     This is your Care EveryWhere ID. This could be used by other organizations to access your [facility] medical records  [id number]        Your Vitals Were     Pulse Temperature Height Last Period Pulse Oximetry Breastfeeding?    85 98.1  F (36.7  C) (Oral) 1.664 m (5' 5.5\") 07/30/2017 98% No    BMI (Body Mass Index)                   25.17 kg/m2            Blood Pressure from Last 3 Encounters:   08/12/17 110/70   04/04/17 118/70   03/12/15 130/85    Weight from Last 3 Encounters:   08/12/17 69.7 kg (153 lb 9.6 oz)   04/04/17 68.9 kg (152 lb)   03/12/15 71.4 kg (157 lb 6 oz)              We Performed the Following     PHYSICAL THERAPY REFERRAL     ThinPrep Pap and HPV (mRNA E6/E7){HPV-REFLEX} ([facility])          Where to get your medicines      These medications were sent to [facility] [address] [place] [address] [address] [address]   [address] [facility] [address]     Phone:  [phone]     drospirenone-ethinyl estradiol 3-0.03 MG per tablet          Primary Care Provider Office Phone # Fax #    Ahsan Saldivar [phone] [address] [address]        Equal Access to Services     ANITA WHEELER : Vasile Abdul, shanae flores, megan pineda. So [facility] [phone].    ATENCIÓN: Si habla español, tiene a lacy disposición servicios gratuitos de asistencia lingüística. Llame al [phone].    We comply with applicable federal civil rights laws and [place] laws. We do not discriminate on the basis of race, color, national origin, age, disability sex, sexual orientation or gender " identity.            Thank you!     Thank you for choosing [facility]  for your care. Our goal is always to provide you with excellent care. Hearing back from our patients is one way we can continue to improve our services. Please take a few minutes to complete the written survey that you may receive in the mail after your visit with us. Thank you!             Your Updated Medication List - Protect others around you: Learn how to safely use, store and throw away your medicines at www.disposemymeds.org.          This list is accurate as of: 8/12/17 11:33 AM.  Always use your most recent med list.                   Brand Name Dispense Instructions for use Diagnosis    drospirenone-ethinyl estradiol 3-0.03 MG per tablet    JOSEPH    84 tablet    Take 1 tablet by mouth daily    Dysmenorrhea, Other acne

## 2017-08-12 NOTE — PROGRESS NOTES
Chief Complaint:  Physical Exam    SUBJECTIVE:   Fiorella Simpson is a 31 year old female presents for routine health maintenance.    Current concerns: Refill of OCP. Restarting after foot surgery. Had been doing well on Kenzie prior to surgery. Getting regular periods, and OCP helps with severe cramping.    1. Do you or anyone in your family have a history of blood clots? No  2. Do you have a history of migraine with aura?  No  3. Do you smoke?  No  4. Do you have a history of hypertension?  No    Would like referral for KATHIE for yung danlos to strengthen muscles, protect joints.    Menses are regular    Patient's last menstrual period was 07/30/2017.    Was last Pap smear normal: Yes  Due for mammogram:  No    Body mass index is 25.17 kg/(m^2).    Present exercise habits:  3-5 times/week. Low impact  Present dietary habits:  eats regular meals and follows a balanced nutrition diet    Calcium intake:  3-4 servings daily  Vit D intake: is taking supplement    Is the patient a smoker? No  If yes, smoking cessation advised and counseling provided.     Cardiovascular risk factors: none    Over the past few weeks, have you felt down or depressed? Little interest or pleasure in doing things? No    If in a relationship are there any Domestic violence concern: No    Last dental appointment:  this year  Last optical appointment:  this year    Was the patient born between [birth year] and has not had Hep C testing?  No, not applicable    I have reviewed the following histories: Past Medical History, Past Surgical History, Social History, Family History, Problem List, Medication List and Allergies    No past medical history on file.  Family History   Problem Relation Age of Onset     C.A.D. Maternal Grandmother      DIABETES Maternal Grandmother      Breast Cancer No family hx of      Cancer - colorectal No family hx of      Other - See Comments Mother      gall bladder removed     Social History     Social History     Marital  status: Single     Spouse name: N/A     Number of children: 0     Years of education: N/A     Occupational History      Other     [employer] -      Social History Main Topics     Smoking status: Never Smoker     Smokeless tobacco: Never Used     Alcohol use 0.0 oz/week     0 drink(s) per week      Comment: rare     Drug use: No     Sexual activity: Yes     Partners: Male     Birth control/ protection: Condom, OCP     Other Topics Concern     Exercise Yes     Bike Helmet Yes     Seat Belt Yes     Social History Narrative     Past Surgical History:   Procedure Laterality Date     C APPENDECTOMY  1999     DENTAL SURGERY  4/2006    [facility]     FOOT SURGERY Right 2009, 2012    fracture repair     GALLBLADDER SURGERY  3/16/11    Dr Okeefe     HERNIORRHAPHY INGUINAL  5/23/2012    Procedure:HERNIORRHAPHY INGUINAL; Left Inguinal Hernia Repair with Mesh         ROS:  E/M: NEGATIVE for ear, nose, mouth and throat problems  R: NEGATIVE for significant/chronic cough or SOB  CV: NEGATIVE for chest pain or palpitations  GI: NEGATIVE for abdominal pain, chronic diarrhea or constipation  :  NEGATIVE for dysuria, hematuria or vaginal discharge. No sexual health concerns.       Current Outpatient Prescriptions   Medication     drospirenone-ethinyl estradiol (JOSEPH) 3-0.03 MG per tablet     [DISCONTINUED] drospirenone-ethinyl estradiol (JOSEPH) 3-0.03 MG per tablet     No current facility-administered medications for this visit.        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 Saw , being managed at [facility]. Contact Slime Saba DNP       Ankle pain 06/26/2013     Priority: Medium     Allergic rhinitis 08/10/2010     Priority: Medium     Problem list name updated by automated process. Provider to review       Obsessive-compulsive disorder 04/25/2007     Priority:  "Medium     Problem list name updated by automated process. Provider to review       Dysmenorrhea 12/06/2005     Priority: Medium     Other acne 12/06/2005     Priority: Medium     Health Care Home 03/18/2013     Priority: Low     State Tier Level:  Tier 1  Status:  n/a  Care Coordinator:      See Letters for [facility] Care Plan           ACP (advance care planning) 07/12/2012     Priority: Low       Advance Care Planning 8/12/2017: ACP Review of Chart / Resources Provided:  Reviewed chart for advance care plan.  Fiorella Simpson has no plan or code status on file. Discussed available resources and provided with information.   Added by Liseth Skaggs                 OBJECTIVE:  /70 (BP Location: Left arm, Patient Position: Chair, Cuff Size: Adult Regular)  Pulse 85  Temp 98.1  F (36.7  C) (Oral)  Ht 1.664 m (5' 5.5\")  Wt 69.7 kg (153 lb 9.6 oz)  LMP 07/30/2017  SpO2 98%  Breastfeeding? No  BMI 25.17 kg/m2        General: 31 year old female who appears her stated age. Vital signs noted  Head: Normocephalic  Eyes: pupils equal round reactive to light and accomodation, extra ocular movements intact  Ears: external canals and TMs free of abnormalities  Nose: patent, without mucosal abnormalities  Mouth and throat: without erythema or lesions of the mucosa  Neck: supple, without adenopathy or thyromegaly  Lungs: clear to auscultation, no wheezing or crackles  Breasts: skin without rash, no dominant mass, no nipple discharge, or axillary adenopathy  Cv: regular rate and rhythm, normal s1 and s2 without murmur or click  Abd: soft, non-tender, no masses, no hepatomegaly or splenomegaly.   (female): normal female external genitalia, normal urethral meatus, vaginal mucosa, normal cervix/adnexa/uterus without masses or discharge  Ms: normal muscle tone & symmetry  Skin: clear to inspection and with no palpable abnormalities.  Neuro: sensation and motor function grossly intact; cranial nerves without obvious " "abnormalities.      ASSESSMENT/PLAN:    1. Encounter for gynecological examination without abnormal finding  Gets labs through her work, so not interested today.  - ThinPrep Pap and HPV (mRNA E6/E7)HPV-REFLEX ([facility])    2. Dysmenorrhea  Will restart on Kenzie. Should start Sunday or Monday after her period starts.  - drospirenone-ethinyl estradiol (KENZIE) 3-0.03 MG per tablet; Take 1 tablet by mouth daily  Dispense: 84 tablet; Refill: 3    3. Other acne  - drospirenone-ethinyl estradiol (KENZIE) 3-0.03 MG per tablet; Take 1 tablet by mouth daily  Dispense: 84 tablet; Refill: 3    4. Screening for cervical cancer  Will send StoredIQ message with results.  - ThinPrep Pap and HPV (mRNA E6/E7)HPV-REFLEX ([facility])    5. Chance-Danlos disease  Will refer to PT for her ESD.   - PHYSICAL THERAPY REFERRAL    6. ACP (advance care planning)       reports that she has never smoked. She has never used smokeless tobacco.      Estimated body mass index is 25.17 kg/(m^2) as calculated from the following:    Height as of this encounter: 1.664 m (5' 5.5\").    Weight as of this encounter: 69.7 kg (153 lb 9.6 oz).          Meds Suggested:      Vitamin D       Calcium  Tests Recommended:      Regular Dental Examinations        Eye exam  Behavior Modifications:       Cardiovascular exercise 3 times per week--enough to get your Target Heart rate  Other recommendations:     BMI noted and discussed      Regular breast exam     Encouraged My Chart    Counseling Resources:  ATP IV Guidelines  Pooled Cohorts Equation Calculator  Breast Cancer Risk Calculator  FRAX Risk Assessment  ICSI Preventive Guidelines  Dietary Guidelines for Americans, 2010  USDA's MyPlate            Marli Chamberlain PA-C  8/12/2017  "

## 2017-08-16 LAB
CLINICAL HISTORY - QUEST: NORMAL
CYTOTECHNOLOGIST - QUEST: NORMAL
DESCRIPTIVE DIAGNOSIS - QUEST: NORMAL
LAST PAP DX - QUEST: NORMAL
LMP - QUEST: NORMAL
PREV BX DX - QUEST: NORMAL
SOURCE: NORMAL
STATEMENT OF ADEQUACY - QUEST: NORMAL

## 2017-10-06 ENCOUNTER — MYC MEDICAL ADVICE (OUTPATIENT)
Dept: FAMILY MEDICINE | Facility: CLINIC | Age: 32
End: 2017-10-06

## 2017-10-06 DIAGNOSIS — Z13.29 SCREENING FOR THYROID DISORDER: Primary | ICD-10-CM

## 2017-10-06 NOTE — TELEPHONE ENCOUNTER
From: Fiorella Simpson  To: Marli Chamberlain PA-C  Sent: 10/6/2017 3:15 PM CDT  Subject: Do I need an appointment?    Faviola Calles,    During my annual physical appointment in August, you had mentioned that you would like to check on my thyroid in the near future. [place] will be in the cities a [place] times between now and Thanksgiving and thought we could try to get this taken care of. Would you let me know if a standard appointment is appropriate or if this would be accomplished by popping in for some lab work?     Thanks in advance,    Patty Simpson

## 2017-10-24 ENCOUNTER — OFFICE VISIT (OUTPATIENT)
Dept: FAMILY MEDICINE | Facility: CLINIC | Age: 32
End: 2017-10-24

## 2017-10-24 VITALS
DIASTOLIC BLOOD PRESSURE: 74 MMHG | HEART RATE: 76 BPM | WEIGHT: 159.8 LBS | OXYGEN SATURATION: 94 % | BODY MASS INDEX: 26.19 KG/M2 | SYSTOLIC BLOOD PRESSURE: 110 MMHG | TEMPERATURE: 98.3 F

## 2017-10-24 DIAGNOSIS — Z13.29 SCREENING FOR THYROID DISORDER: ICD-10-CM

## 2017-10-24 DIAGNOSIS — Z23 NEED FOR VACCINATION: ICD-10-CM

## 2017-10-24 DIAGNOSIS — S69.91XA HAND INJURY, RIGHT, INITIAL ENCOUNTER: Primary | ICD-10-CM

## 2017-10-24 PROCEDURE — 90471 IMMUNIZATION ADMIN: CPT | Performed by: PHYSICIAN ASSISTANT

## 2017-10-24 PROCEDURE — 84443 ASSAY THYROID STIM HORMONE: CPT | Mod: 90 | Performed by: PHYSICIAN ASSISTANT

## 2017-10-24 PROCEDURE — 36415 COLL VENOUS BLD VENIPUNCTURE: CPT | Performed by: PHYSICIAN ASSISTANT

## 2017-10-24 PROCEDURE — 73130 X-RAY EXAM OF HAND: CPT | Mod: RT | Performed by: PHYSICIAN ASSISTANT

## 2017-10-24 PROCEDURE — 90686 IIV4 VACC NO PRSV 0.5 ML IM: CPT | Performed by: PHYSICIAN ASSISTANT

## 2017-10-24 PROCEDURE — 99213 OFFICE O/P EST LOW 20 MIN: CPT | Mod: 25 | Performed by: PHYSICIAN ASSISTANT

## 2017-10-24 NOTE — PROGRESS NOTES
CC: Hand injury    History:  4 days ago, was on vacation. Went to sleep without symptoms. Woke-up Saturday morning with pain in right 2nd finger at knuckle. Has a history of being a very active sleeper confirmed by sleep study, so Patty feels she most likely hit hand against bed frame or other object while sleeping. Pain is worse on palmar surface at base of 2nd digit of right hand. Tenderness to palpation from palmar direction. 5-6/10 pain. Minimal swelling or redness at the site.     Has been taking acetaminophen and icing.     Has diagnosed of Chance Danlos.     Also would like to have thyroid labs done today as I had commented before that neck felt full.     PMH, MEDICATIONS, ALLERGIES, SOCIAL AND FAMILY HISTORY in [facility] and reviewed by me personally.      ROS negative other than the symptoms noted above in the HPI.        Examination   /74 (BP Location: Left arm, Patient Position: Chair, Cuff Size: Adult Regular)  Pulse 76  Temp 98.3  F (36.8  C) (Oral)  Wt 72.5 kg (159 lb 12.8 oz)  SpO2 94%  Breastfeeding? No  BMI 26.19 kg/m2       Constitutional: Sitting comfortably, in no acute distress. Vital signs noted  Neck: Thyroid with mild fullness, but no masses/nodule palpated.  Cardiovascular:  regular rate and rhythm, no murmurs, clicks, or gallops  Respiratory:  normal respiratory rate and rhythm, lungs clear to auscultation  M/S: Right hand 2nd digit with mild edema at base near MCP joint. Tenderness to palpation. ROM of right 2nd digit 75% of normal flexion, full extension.   NEURO:  muscle strength 4/5 with flexion, abduction of finger, sensation to light touch and pinprick normal, reflexes normal and symmetric  SKIN: No jaundice/pallor/rash or ecchymosis.  Psychiatric: mentation appears normal and affect normal/bright        A/P    ICD-10-CM    1. Hand injury, right, initial encounter S69.91XA X-ray rt Hand G/E 3 vws*   2. Need for vaccination Z23 VACCINE ADMINISTRATION, INITIAL     HC FLU VAC  PRESRV FREE QUAD SPLIT VIR 3+YRS IM   3. Screening for thyroid disorder Z13.29 TSH with free T4 reflex     VENOUS COLLECTION       DISCUSSION: X-ray appears negative- confirmed by radiology. This is most likely an acute local inflammatory reaction causing tendonitis.  Recommended:  Apply ice two the area 2-3 times daily for 20 minutes. Wear brace as much as possible to limit extreme movements. Take ibuprofen 2-3 times daily (2 tablets at a time) with food to help with pain and inflammation. Can consider taking Tylenol in between to help with pain as well.     Can schedule with an orthopedic specialist to consider further imaging. Will right referral to ortho if needed.    Contact me in 1 week if not significantly better.    Will check thyroid labs and contact with results via [employer].    follow up visit: As needed    MERYL [facility]

## 2017-10-24 NOTE — MR AVS SNAPSHOT
After Visit Summary   10/24/2017    Fiorella Simpson    MRN: [id number]           Patient Information     Date Of Birth          1985        Visit Information        Provider Department      10/24/2017 1:30 PM Marli Chamberlain PA-C [facility]        Today's Diagnoses     Hand injury, right, initial encounter    -  1    Need for vaccination        Screening for thyroid disorder           Follow-ups after your visit        Follow-up notes from your care team     Return if symptoms worsen or fail to improve.      Who to contact     If you have questions or need follow up information about today's clinic visit or your schedule please contact [facility] directly at [phone].  Normal or non-critical lab and imaging results will be communicated to you by Mesospherehart, letter or phone within 4 business days after the clinic has received the results. If you do not hear from us within 7 days, please contact the clinic through Mesospherehart or phone. If you have a critical or abnormal lab result, we will notify you by phone as soon as possible.  Submit refill requests through [employer] or call your pharmacy and they will forward the refill request to us. Please allow 3 business days for your refill to be completed.          Additional Information About Your Visit        MyChart Information     [employer] gives you secure access to your electronic health record. If you see a primary care provider, you can also send messages to your care team and make appointments. If you have questions, please call your primary care clinic.  If you do not have a primary care provider, please call [phone] and they will assist you.        Care EveryWhere ID     This is your Care EveryWhere ID. This could be used by other organizations to access your [place] medical records  [id number]        Your Vitals Were     Pulse Temperature Pulse Oximetry Breastfeeding? BMI (Body Mass  Index)       76 98.3  F (36.8  C) (Oral) 94% No 26.19 kg/m2        Blood Pressure from Last 3 Encounters:   10/24/17 110/74   08/12/17 110/70   04/04/17 118/70    Weight from Last 3 Encounters:   10/24/17 72.5 kg (159 lb 12.8 oz)   08/12/17 69.7 kg (153 lb 9.6 oz)   04/04/17 68.9 kg (152 lb)              We Performed the Following     HC FLU VAC PRESRV FREE QUAD SPLIT VIR 3+YRS IM     TSH with free T4 reflex     VACCINE ADMINISTRATION, INITIAL     VENOUS COLLECTION     X-ray rt Hand G/E 3 vws*        Primary Care Provider Office Phone # Fax #    Ahsan Saldivar [phone] [address] [facility] [address]  [facility] [address]        Equal Access to Services     SA[facility]ER [facility] HospitalBERNADINE : Hadii aad ku hadasho Soomaali, waaxda luqadaha, qaybta kaalmada adeegyada, waxay idiin hayaan adekaila moreno . So [facility] [phone].    ATENCIÓN: Si habla español, tiene a lacy disposición servicios gratuitos de asistencia lingüística. Llame al [phone].    We comply with applicable federal civil rights laws and [place] laws. We do not discriminate on the basis of race, color, national origin, age, disability, sex, sexual orientation, or gender identity.            Thank you!     Thank you for choosing [facility]  for your care. Our goal is always to provide you with excellent care. Hearing back from our patients is one way we can continue to improve our services. Please take a few minutes to complete the written survey that you may receive in the mail after your visit with us. Thank you!             Your Updated Medication List - Protect others around you: Learn how to safely use, store and throw away your medicines at www.disposemymeds.org.          This list is accurate as of: 10/24/17 11:59 PM.  Always use your most recent med list.                   Brand Name Dispense Instructions for use Diagnosis    drospirenone-ethinyl estradiol 3-0.03 MG per tablet    JOSEPH    84 tablet     Take 1 tablet by mouth daily    Dysmenorrhea, Other acne

## 2017-10-24 NOTE — NURSING NOTE
Patty is here for a Right hand pain.     Pre-Visit Screening :  Immunizations : up to date    Colonoscopy : NA  Mammogram : NA  Asthma Action Test/Plan : NA  PHQ9/GAD7 :  NA  Pulse - regular  My Chart - accepts    CLASSIFICATION OF OVERWEIGHT AND OBESITY BY BMI                         Obesity Class           BMI(kg/m2)  Underweight                                    < 18.5  Normal                                         18.5-24.9  Overweight                                     25.0-29.9  OBESITY                     I                  30.0-34.9                              II                 35.0-39.9  EXTREME OBESITY             III                >40                             Patient's  BMI Body mass index is 26.19 kg/(m^2).  http://hin.nhlbi.nih.gov/menuplanner/menu.cgi  Questioned patient about current smoking habits.  Pt. has never smoked.    Christine.СЕРГЕЙ Chu ([facility])

## 2017-10-25 LAB — TSH SERPL-ACNC: 2.25 MIU/L

## 2017-11-28 ENCOUNTER — TRANSFERRED RECORDS (OUTPATIENT)
Dept: FAMILY MEDICINE | Facility: CLINIC | Age: 32
End: 2017-11-28

## 2018-03-27 DIAGNOSIS — N94.6 DYSMENORRHEA: ICD-10-CM

## 2018-03-27 DIAGNOSIS — L70.8 OTHER ACNE: ICD-10-CM

## 2018-03-27 RX ORDER — DROSPIRENONE AND ETHINYL ESTRADIOL 0.03MG-3MG
1 KIT ORAL DAILY
Qty: 84 TABLET | Refills: 1 | Status: SHIPPED | OUTPATIENT
Start: 2018-03-27

## 2018-03-27 NOTE — TELEPHONE ENCOUNTER
Fiorella Simpson is requesting a refill of:    Pending Prescriptions:                       Disp   Refills    drospirenone-ethinyl estradiol (JOSEPH) 3*84 tab*1            Sig: Take 1 tablet by mouth daily    Needs to be sent to her mail order now.    Thanks,Isha

## 2018-04-25 ENCOUNTER — OFFICE VISIT (OUTPATIENT)
Dept: FAMILY MEDICINE | Facility: CLINIC | Age: 33
End: 2018-04-25

## 2018-04-25 VITALS
DIASTOLIC BLOOD PRESSURE: 72 MMHG | BODY MASS INDEX: 25.3 KG/M2 | SYSTOLIC BLOOD PRESSURE: 112 MMHG | HEART RATE: 103 BPM | WEIGHT: 154.4 LBS | TEMPERATURE: 98.8 F | OXYGEN SATURATION: 98 %

## 2018-04-25 DIAGNOSIS — K59.00 CONSTIPATION, UNSPECIFIED CONSTIPATION TYPE: ICD-10-CM

## 2018-04-25 DIAGNOSIS — Q79.60 EHLERS-DANLOS SYNDROME: Primary | ICD-10-CM

## 2018-04-25 DIAGNOSIS — R19.7 BLOODY DIARRHEA: ICD-10-CM

## 2018-04-25 DIAGNOSIS — I73.00 RAYNAUD'S PHENOMENON WITHOUT GANGRENE: ICD-10-CM

## 2018-04-25 DIAGNOSIS — M25.50 MULTIPLE JOINT PAIN: ICD-10-CM

## 2018-04-25 DIAGNOSIS — R63.8 SALT CRAVING: ICD-10-CM

## 2018-04-25 PROCEDURE — 99214 OFFICE O/P EST MOD 30 MIN: CPT | Performed by: FAMILY MEDICINE

## 2018-04-25 NOTE — MR AVS SNAPSHOT
After Visit Summary   4/25/2018    Fiorella Simpson    MRN: [id number]           Patient Information     Date Of Birth          1985        Visit Information        Provider Department      4/25/2018 4:15 PM Ahsan Saldivar MD [facility]        Today's Diagnoses     Chance-Danlos syndrome    -  1    Bloody diarrhea        Constipation, unspecified constipation type        Multiple joint pain        Salt craving        Raynaud's phenomenon without gangrene           Follow-ups after your visit        Who to contact     If you have questions or need follow up information about today's clinic visit or your schedule please contact [facility] directly at [phone].  Normal or non-critical lab and imaging results will be communicated to you by MyChart, letter or phone within 4 business days after the clinic has received the results. If you do not hear from us within 7 days, please contact the clinic through gauzzhart or phone. If you have a critical or abnormal lab result, we will notify you by phone as soon as possible.  Submit refill requests through [employer] or call your pharmacy and they will forward the refill request to us. Please allow 3 business days for your refill to be completed.          Additional Information About Your Visit        MyChart Information     [employer] gives you secure access to your electronic health record. If you see a primary care provider, you can also send messages to your care team and make appointments. If you have questions, please call your primary care clinic.  If you do not have a primary care provider, please call [phone] and they will assist you.        Care EveryWhere ID     This is your Care EveryWhere ID. This could be used by other organizations to access your [place] medical records  [id number]        Your Vitals Were     Pulse Temperature Last Period Pulse Oximetry BMI (Body Mass Index)        103 98.8  F (37.1  C) (Oral) 04/19/2018 98% 25.3 kg/m2        Blood Pressure from Last 3 Encounters:   04/25/18 112/72   10/24/17 110/74   08/12/17 110/70    Weight from Last 3 Encounters:   04/25/18 70 kg (154 lb 6.4 oz)   10/24/17 72.5 kg (159 lb 12.8 oz)   08/12/17 69.7 kg (153 lb 9.6 oz)              Today, you had the following     No orders found for display       Primary Care Provider Office Phone # Fax #    Ahsan Saldivar [phone] [address] [facility] [address]  [facility] [address]        Equal Access to Services     SAWYER WHEELER : Hadii aad ku hadasho Soomaali, waaxda luqadaha, qaybta kaalmada adeegyada, megan moreno . So [facility] [phone].    ATENCIÓN: Si habla español, tiene a lacy disposición servicios gratuitos de asistencia lingüística. [facility] [phone].    We comply with applicable federal civil rights laws and [place] laws. We do not discriminate on the basis of race, color, national origin, age, disability, sex, sexual orientation, or gender identity.            Thank you!     Thank you for choosing [facility]  for your care. Our goal is always to provide you with excellent care. Hearing back from our patients is one way we can continue to improve our services. Please take a few minutes to complete the written survey that you may receive in the mail after your visit with us. Thank you!             Your Updated Medication List - Protect others around you: Learn how to safely use, store and throw away your medicines at www.disposemymeds.org.          This list is accurate as of 4/25/18  6:28 PM.  Always use your most recent med list.                   Brand Name Dispense Instructions for use Diagnosis    drospirenone-ethinyl estradiol 3-0.03 MG per tablet    JOSEPH    84 tablet    Take 1 tablet by mouth daily    Dysmenorrhea, Other acne

## 2018-04-25 NOTE — NURSING NOTE
Fiorella is here to discuss many issues today which may or may not be related to her Ana    Pre-visit Screening:  Immunizations:  up to date  Colonoscopy:  NA  Mammogram: NA  Asthma Action Test/Plan:  NA  PHQ9:  NA  GAD7:  NA  Questioned patient about current smoking habits Pt. has never smoked.  Ok to leave detailed message on voice mail for today's visit only Yes, phone # [phone]

## 2019-09-29 ENCOUNTER — HEALTH MAINTENANCE LETTER (OUTPATIENT)
Age: 34
End: 2019-09-29

## 2021-01-14 ENCOUNTER — HEALTH MAINTENANCE LETTER (OUTPATIENT)
Age: 36
End: 2021-01-14

## 2021-10-24 ENCOUNTER — HEALTH MAINTENANCE LETTER (OUTPATIENT)
Age: 36
End: 2021-10-24

## 2022-02-13 ENCOUNTER — HEALTH MAINTENANCE LETTER (OUTPATIENT)
Age: 37
End: 2022-02-13

## 2022-10-15 ENCOUNTER — HEALTH MAINTENANCE LETTER (OUTPATIENT)
Age: 37
End: 2022-10-15

## 2023-03-26 ENCOUNTER — HEALTH MAINTENANCE LETTER (OUTPATIENT)
Age: 38
End: 2023-03-26